# Patient Record
Sex: FEMALE | Race: WHITE | Employment: FULL TIME | URBAN - METROPOLITAN AREA
[De-identification: names, ages, dates, MRNs, and addresses within clinical notes are randomized per-mention and may not be internally consistent; named-entity substitution may affect disease eponyms.]

---

## 2021-06-16 PROBLEM — J02.0 STREP PHARYNGITIS: Status: ACTIVE | Noted: 2021-06-06

## 2021-09-13 ENCOUNTER — HOSPITAL ENCOUNTER (OUTPATIENT)
Facility: CLINIC | Age: 26
Discharge: HOME | End: 2021-09-13
Attending: STUDENT IN AN ORGANIZED HEALTH CARE EDUCATION/TRAINING PROGRAM | Admitting: STUDENT IN AN ORGANIZED HEALTH CARE EDUCATION/TRAINING PROGRAM
Payer: COMMERCIAL

## 2021-09-13 ENCOUNTER — HOSPITAL ENCOUNTER (EMERGENCY)
Facility: CLINIC | Age: 26
Discharge: LEFT BEFORE DISCHARGE | End: 2021-09-13

## 2021-09-13 ENCOUNTER — HOSPITAL ENCOUNTER (OUTPATIENT)
Facility: CLINIC | Age: 26
End: 2021-09-13
Admitting: STUDENT IN AN ORGANIZED HEALTH CARE EDUCATION/TRAINING PROGRAM
Payer: COMMERCIAL

## 2021-09-13 VITALS
HEART RATE: 75 BPM | HEIGHT: 63 IN | BODY MASS INDEX: 22.32 KG/M2 | TEMPERATURE: 98.2 F | RESPIRATION RATE: 16 BRPM | WEIGHT: 126 LBS | SYSTOLIC BLOOD PRESSURE: 116 MMHG | DIASTOLIC BLOOD PRESSURE: 60 MMHG

## 2021-09-13 VITALS
RESPIRATION RATE: 16 BRPM | WEIGHT: 126 LBS | TEMPERATURE: 97.6 F | OXYGEN SATURATION: 100 % | SYSTOLIC BLOOD PRESSURE: 103 MMHG | DIASTOLIC BLOOD PRESSURE: 54 MMHG | HEART RATE: 80 BPM

## 2021-09-13 DIAGNOSIS — Z36.89 ENCOUNTER FOR TRIAGE IN PREGNANT PATIENT: Primary | ICD-10-CM

## 2021-09-13 DIAGNOSIS — O23.599 BACTERIAL VAGINOSIS IN PREGNANCY: ICD-10-CM

## 2021-09-13 DIAGNOSIS — B96.89 BACTERIAL VAGINOSIS IN PREGNANCY: ICD-10-CM

## 2021-09-13 LAB
ALBUMIN UR-MCNC: NEGATIVE MG/DL
AMORPH CRY #/AREA URNS HPF: ABNORMAL /HPF
APPEARANCE UR: ABNORMAL
BACTERIA #/AREA URNS HPF: ABNORMAL /HPF
BILIRUB UR QL STRIP: NEGATIVE
CLUE CELLS: PRESENT
COLOR UR AUTO: ABNORMAL
GLUCOSE UR STRIP-MCNC: NEGATIVE MG/DL
HGB UR QL STRIP: NEGATIVE
KETONES UR STRIP-MCNC: NEGATIVE MG/DL
LEUKOCYTE ESTERASE UR QL STRIP: NEGATIVE
NITRATE UR QL: NEGATIVE
PH UR STRIP: 8 [PH] (ref 5–7)
RBC URINE: 2 /HPF
SP GR UR STRIP: 1.02 (ref 1–1.03)
SQUAMOUS EPITHELIAL: <1 /HPF
TRICHOMONAS, WET PREP: ABNORMAL
UROBILINOGEN UR STRIP-MCNC: <2 MG/DL
WBC URINE: 1 /HPF
WBC'S/HIGH POWER FIELD, WET PREP: ABNORMAL
YEAST, WET PREP: ABNORMAL

## 2021-09-13 PROCEDURE — 87210 SMEAR WET MOUNT SALINE/INK: CPT | Performed by: STUDENT IN AN ORGANIZED HEALTH CARE EDUCATION/TRAINING PROGRAM

## 2021-09-13 PROCEDURE — G0463 HOSPITAL OUTPT CLINIC VISIT: HCPCS

## 2021-09-13 PROCEDURE — 81001 URINALYSIS AUTO W/SCOPE: CPT | Performed by: STUDENT IN AN ORGANIZED HEALTH CARE EDUCATION/TRAINING PROGRAM

## 2021-09-13 RX ORDER — LIDOCAINE 40 MG/G
CREAM TOPICAL
Status: DISCONTINUED | OUTPATIENT
Start: 2021-09-13 | End: 2021-09-13 | Stop reason: HOSPADM

## 2021-09-13 RX ORDER — PRENATAL VIT/IRON FUM/FOLIC AC 27MG-0.8MG
1 TABLET ORAL DAILY
COMMUNITY

## 2021-09-13 RX ORDER — METRONIDAZOLE 500 MG/1
500 TABLET ORAL 2 TIMES DAILY
Qty: 14 TABLET | Refills: 0 | Status: SHIPPED | OUTPATIENT
Start: 2021-09-13 | End: 2021-09-20

## 2021-09-13 ASSESSMENT — MIFFLIN-ST. JEOR: SCORE: 1290.66

## 2021-09-13 NOTE — PROGRESS NOTES
[place] [facility]/o abdominal cramping. . 18 wks. Pt brought to room 2120. VSS. FHT's via doppler 155, movement noted. Pt states she has an anterior placenta and does not feel regular fetal movement. VSS. Uterus soft, non tender. Pt states she has vaginal discharge and hx of UTI's. Provider notified. UA and wet prep collected.

## 2021-09-13 NOTE — PROGRESS NOTES
"OB Triage Note        Assessment and Plan:     Jaz Richey is a 26 year old  at 18w0d coming in for crampy abdominal pain with a UA and wet prep supportive of BV. Considered spontaneous  given history of  vaginal births though not likely given normal FHR on doppler with normal movement, no bleeding or fluid. Cervical change unknown given posterior cervix unable to assess. Discussed with patient, agreed to antibiotic treatment, encouraged PO fluids, and will follow-up with outpatient OB.     Patient Active Problem List   Diagnosis     Encounter for triage in pregnant patient     -Start Metronidazole 500mg BID for 7 day course  -Encourage PO fluid  -Follow-up outpatient OB in 7 days or sooner if symptoms persist or worsen  -Discharge to home    Patient discussed with attending physician, Dr. Nona Hart , who agrees with the plan.      Cristina Cortez MD PGY1 2021  [facility] Medicine Residency Program       Subjective:     Jaz Richey is a  26 year old female at 18w0d with a current prenatal history significant for prior vaginal births at 36 weeks gestation who presents to OB triage with \"crampy\" abdominal pain. Had intercourse 3 days ago with some uterine irritation, became constant \"crampy\" lower abdominal and low back pain morning of 21. Cramping is constant, does not come and go.  No fever, n/v, coughing, SOB, chest pain, urinary frequency, or dysuria. No change in discharge, no fluid, spotting, or change in odor.     Estimated Date of Delivery: 2022 No LMP recorded. Patient is pregnant.       Her prenatal course has been uncomplicated.    Prenatal labs:   No results found for: ABO, RH, AS, HEPBANG, CHPCRT, GCPCRT, TREPAB, RUBELLAABIGG, HGB, HIV, GBS       Review of Systems:   EYES: no acute vision problems or changes  ENT: no ear problems, no mouth problems, no throat problems  RESP: no significant cough, no shortness of " "breath  CV: no chest pain, no palpitations, no new or worsening peripheral edema  GI: no nausea, no vomiting, no constipation, no diarrhea  : no frequency, no dysuria, no hematuria  NEURO: no weakness, no dizziness, no headaches         Physical Exam:   Vitals:   /60   Pulse 75   Temp 98.2  F (36.8  C) (Oral)   Resp 16   Ht 1.6 m (5' 3\")   Wt 57.2 kg (126 lb)   BMI 22.32 kg/m    126 lbs 0 oz  Estimated body mass index is 22.32 kg/m  as calculated from the following:    Height as of this encounter: 1.6 m (5' 3\").    Weight as of this encounter: 57.2 kg (126 lb).    GEN: Awake, alert in no apparent distress, sitting comfortably in bed   HEENT: grossly normal  RESPIRATORY: clear to auscultation bilaterally, no increased work of breathing  BACK:  no costovertebral angle tenderness   CARDIOVASCULAR: RRR, no murmur  ABDOMEN: gravid, soft, NTTP  PELVIC:  no fluid noted, no blood noted.   Cervix: Posterior, unable to assess  EXT:  no edema or calf tenderness    Doppler  Fetal baseline rate 155 normal      Labs today:  Results for orders placed or performed during the hospital encounter of 09/13/21   UA with Microscopic     Status: Abnormal   Result Value Ref Range    Color Urine Light Yellow Colorless, Straw, Light Yellow, Yellow    Appearance Urine Turbid (A) Clear    Glucose Urine Negative Negative mg/dL    Bilirubin Urine Negative Negative    Ketones Urine Negative Negative mg/dL    Specific Gravity Urine 1.017 1.001 - 1.030    Blood Urine Negative Negative    pH Urine 8.0 (H) 5.0 - 7.0    Protein Albumin Urine Negative Negative mg/dL    Urobilinogen Urine <2.0 <2.0 mg/dL    Nitrite Urine Negative Negative    Leukocyte Esterase Urine Negative Negative    Bacteria Urine Few (A) None Seen /HPF    Amorphous Crystals Urine Moderate (A) None Seen /HPF    RBC Urine 2 <=2 /HPF    WBC Urine 1 <=5 /HPF    Squamous Epithelials Urine <1 <=1 /HPF   Wet preparation     Status: Abnormal    Specimen: Vagina; Swab   Result " Value Ref Range    Trichomonas Absent Absent    Yeast Absent Absent    Clue Cells Present (A) Absent    WBCs/high power field 2+ (A) None

## 2021-09-13 NOTE — DISCHARGE INSTRUCTIONS
Please excuse patient from work today, 9/13/2021, to allow for therapeutic rest. Patient may return to work after 9/13/2021 without restrictions.

## 2021-10-21 ENCOUNTER — LAB REQUISITION (OUTPATIENT)
Dept: LAB | Facility: CLINIC | Age: 26
End: 2021-10-21
Payer: MEDICAID

## 2021-10-21 DIAGNOSIS — Z03.818 ENCOUNTER FOR OBSERVATION FOR SUSPECTED EXPOSURE TO OTHER BIOLOGICAL AGENTS RULED OUT: ICD-10-CM

## 2021-10-21 PROCEDURE — U0003 INFECTIOUS AGENT DETECTION BY NUCLEIC ACID (DNA OR RNA); SEVERE ACUTE RESPIRATORY SYNDROME CORONAVIRUS 2 (SARS-COV-2) (CORONAVIRUS DISEASE [COVID-19]), AMPLIFIED PROBE TECHNIQUE, MAKING USE OF HIGH THROUGHPUT TECHNOLOGIES AS DESCRIBED BY CMS-2020-01-R: HCPCS | Performed by: FAMILY MEDICINE

## 2021-10-23 LAB — SARS-COV-2 RNA RESP QL NAA+PROBE: NOT DETECTED

## 2021-11-01 PROCEDURE — U0003 INFECTIOUS AGENT DETECTION BY NUCLEIC ACID (DNA OR RNA); SEVERE ACUTE RESPIRATORY SYNDROME CORONAVIRUS 2 (SARS-COV-2) (CORONAVIRUS DISEASE [COVID-19]), AMPLIFIED PROBE TECHNIQUE, MAKING USE OF HIGH THROUGHPUT TECHNOLOGIES AS DESCRIBED BY CMS-2020-01-R: HCPCS | Mod: ORL | Performed by: FAMILY MEDICINE

## 2021-11-03 ENCOUNTER — LAB REQUISITION (OUTPATIENT)
Dept: LAB | Facility: CLINIC | Age: 26
End: 2021-11-03
Payer: COMMERCIAL

## 2021-11-03 DIAGNOSIS — Z03.818 ENCOUNTER FOR OBSERVATION FOR SUSPECTED EXPOSURE TO OTHER BIOLOGICAL AGENTS RULED OUT: ICD-10-CM

## 2021-11-04 LAB — SARS-COV-2 RNA RESP QL NAA+PROBE: NEGATIVE

## 2021-12-29 PROCEDURE — U0005 INFEC AGEN DETEC AMPLI PROBE: HCPCS | Mod: ORL | Performed by: FAMILY MEDICINE

## 2021-12-30 ENCOUNTER — LAB REQUISITION (OUTPATIENT)
Dept: LAB | Facility: CLINIC | Age: 26
End: 2021-12-30
Payer: COMMERCIAL

## 2021-12-30 DIAGNOSIS — Z20.822 CONTACT WITH AND (SUSPECTED) EXPOSURE TO COVID-19: ICD-10-CM

## 2021-12-31 LAB — SARS-COV-2 RNA RESP QL NAA+PROBE: NEGATIVE

## 2022-03-27 ENCOUNTER — HOSPITAL ENCOUNTER (EMERGENCY)
Facility: CLINIC | Age: 27
Discharge: HOME | End: 2022-03-27
Attending: EMERGENCY MEDICINE | Admitting: EMERGENCY MEDICINE
Payer: COMMERCIAL

## 2022-03-27 VITALS
RESPIRATION RATE: 18 BRPM | TEMPERATURE: 97.9 F | SYSTOLIC BLOOD PRESSURE: 135 MMHG | HEART RATE: 100 BPM | OXYGEN SATURATION: 100 % | DIASTOLIC BLOOD PRESSURE: 78 MMHG | WEIGHT: 132 LBS

## 2022-03-27 DIAGNOSIS — J02.0 STREPTOCOCCAL PHARYNGITIS: ICD-10-CM

## 2022-03-27 PROCEDURE — 99283 EMERGENCY DEPT VISIT LOW MDM: CPT

## 2022-03-27 RX ORDER — AMOXICILLIN 500 MG/1
1000 CAPSULE ORAL 2 TIMES DAILY
Qty: 28 CAPSULE | Refills: 0 | Status: SHIPPED | OUTPATIENT
Start: 2022-03-27 | End: 2022-04-03

## 2022-03-27 ASSESSMENT — ENCOUNTER SYMPTOMS
FEVER: 0
CHILLS: 1
SORE THROAT: 1
ABDOMINAL PAIN: 0
SHORTNESS OF BREATH: 0

## 2022-03-28 NOTE — ED PROVIDER NOTES
EMERGENCY DEPARTMENT ENCOUNTER      NAME: Mery Edmondson  AGE: 26 year old female  YOB: 1995  MRN: [id number]  EVALUATION DATE & TIME: 3/27/2022 11:40 PM    PCP: No Ref-Primary, Physician    ED PROVIDER: Michelet Conley M.D.      Chief Complaint   Patient presents with     Pharyngitis         FINAL IMPRESSION:  1. Streptococcal pharyngitis          ED COURSE & MEDICAL DECISION MAKING:    Pertinent Labs & Imaging studies reviewed. (See chart for details)  26 year old female presents to the Emergency Department for evaluation of sore throat.  Patient has a history of strep.  Does appear to be strep here.  Patient was referred to be treated versus being tested.  I think that is reasonable as she is positive by [facility] criteria.  Will discharge her home.  Given amoxicillin.  Continue Tylenol ibuprofen.  No signs of airway compromise.  No signs of abscess.    11:43 PM I met with the patient to gather history and to perform my initial exam. I discussed the plan for care while in the Emergency Department. PPE: gloves, N95, and eye protection. We discussed the plan for discharge and the patient is agreeable. Reviewed supportive cares, symptomatic treatment, outpatient follow up, and reasons to return to the Emergency Department. Patient to be discharged by ED RN.     At the conclusion of the encounter I discussed the results of all of the tests and the disposition. The questions were answered. The patient or family acknowledged understanding and was agreeable with the care plan.            MEDICATIONS GIVEN IN THE EMERGENCY:  Medications - No data to display    NEW PRESCRIPTIONS STARTED AT TODAY'S ER VISIT  New Prescriptions    AMOXICILLIN (AMOXIL) 500 MG CAPSULE    Take 2 capsules (1,000 mg) by mouth 2 times daily for 7 days          =================================================================    HPI    Patient information was obtained from: Patient    Use of : N/A         Mery Edmondson is a 26  year old female with a pertinent history of strep pharyngitis who presents to this ED via walk-in for evaluation of pharyngitis.  She has had a sore throat starting this morning.  Has noticed worsening lymphadenopathy.  No cough.  Has had low-grade temp but no true fever.  Has a history of frequent strep infections.  No other sick contacts.  No abdominal pain.  No shortness of breath.  Does not take any medications.  Does smoke cigarettes.        REVIEW OF SYSTEMS   Review of Systems   Constitutional: Positive for chills. Negative for fever.   HENT: Positive for sore throat.    Respiratory: Negative for shortness of breath.    Cardiovascular: Negative for chest pain.   Gastrointestinal: Negative for abdominal pain.   All other systems reviewed and are negative.       PAST MEDICAL HISTORY:  History reviewed. No pertinent past medical history.    PAST SURGICAL HISTORY:  History reviewed. No pertinent surgical history.        CURRENT MEDICATIONS:    No current facility-administered medications for this encounter.     Current Outpatient Medications   Medication     amoxicillin (AMOXIL) 500 MG capsule     ibuprofen (ADVIL,MOTRIN) 600 MG tablet         ALLERGIES:  Allergies   Allergen Reactions     Clindamycin Hives and Rash       FAMILY HISTORY:  History reviewed. No pertinent family history.    SOCIAL HISTORY:   Social History     Socioeconomic History     Marital status: Single     Spouse name: Not on file     Number of children: Not on file     Years of education: Not on file     Highest education level: Not on file   Occupational History     Not on file   Tobacco Use     Smoking status: Not on file     Smokeless tobacco: Not on file   Substance and Sexual Activity     Alcohol use: Not on file     Drug use: Not on file     Sexual activity: Not on file   Other Topics Concern     Not on file   Social History Narrative     Not on file     Social Determinants of Health     Financial Resource Strain: Not on file   Food  Insecurity: Not on file   Transportation Needs: Not on file   Physical Activity: Not on file   Stress: Not on file   Social Connections: Not on file   Intimate Partner Violence: Not on file   Housing Stability: Not on file       VITALS:  /78   Pulse 100   Temp 97.9  F (36.6  C) (Oral)   Resp 18   Wt 59.9 kg (132 lb)   LMP 03/22/2022   SpO2 100%     PHYSICAL EXAM    Physical Exam  Constitutional:       General: She is not in acute distress.     Appearance: She is not diaphoretic.   HENT:      Head: Atraumatic.      Mouth/Throat:      Mouth: Mucous membranes are moist.      Pharynx: Oropharyngeal exudate and posterior oropharyngeal erythema present.      Tonsils: Tonsillar exudate present. No tonsillar abscesses.   Eyes:      General: No scleral icterus.     Pupils: Pupils are equal, round, and reactive to light.   Cardiovascular:      Rate and Rhythm: Normal rate and regular rhythm.      Heart sounds: Normal heart sounds.   Pulmonary:      Effort: No respiratory distress.      Breath sounds: Normal breath sounds.   Abdominal:      General: Bowel sounds are normal.      Palpations: Abdomen is soft.      Tenderness: There is no abdominal tenderness.   Musculoskeletal:         General: No tenderness.   Lymphadenopathy:      Cervical: Cervical adenopathy present.   Skin:     General: Skin is warm.      Findings: No rash.           LAB:  All pertinent labs reviewed and interpreted.  Labs Ordered and Resulted from Time of ED Arrival to Time of ED Departure - No data to display    RADIOLOGY:  Reviewed all pertinent imaging. Please see official radiology report.  No orders to display           IDania, am serving as a scribe to document services personally performed by Dr. Michelet Conley, based on my observation and the provider's statements to me. IMichelet MD attest that Dania Sarabia is acting in a scribe capacity, has observed my performance of the services and has documented them in accordance  with my direction.    Michelet Conley M.D.  Emergency Medicine  [facility] EMERGENCY ROOM  [address] [id number] [phone]  Dept: [phone]     Michelet Conley MD  03/27/22 6907

## 2022-09-21 PROCEDURE — U0003 INFECTIOUS AGENT DETECTION BY NUCLEIC ACID (DNA OR RNA); SEVERE ACUTE RESPIRATORY SYNDROME CORONAVIRUS 2 (SARS-COV-2) (CORONAVIRUS DISEASE [COVID-19]), AMPLIFIED PROBE TECHNIQUE, MAKING USE OF HIGH THROUGHPUT TECHNOLOGIES AS DESCRIBED BY CMS-2020-01-R: HCPCS | Mod: ORL | Performed by: FAMILY MEDICINE

## 2022-09-29 ENCOUNTER — LAB REQUISITION (OUTPATIENT)
Dept: LAB | Facility: CLINIC | Age: 27
End: 2022-09-29
Payer: COMMERCIAL

## 2022-09-29 DIAGNOSIS — U07.1 COVID-19: ICD-10-CM

## 2022-09-29 PROCEDURE — U0003 INFECTIOUS AGENT DETECTION BY NUCLEIC ACID (DNA OR RNA); SEVERE ACUTE RESPIRATORY SYNDROME CORONAVIRUS 2 (SARS-COV-2) (CORONAVIRUS DISEASE [COVID-19]), AMPLIFIED PROBE TECHNIQUE, MAKING USE OF HIGH THROUGHPUT TECHNOLOGIES AS DESCRIBED BY CMS-2020-01-R: HCPCS | Mod: ORL | Performed by: FAMILY MEDICINE

## 2022-09-30 LAB — SARS-COV-2 RNA RESP QL NAA+PROBE: NEGATIVE

## 2022-12-18 ENCOUNTER — HOSPITAL ENCOUNTER (EMERGENCY)
Facility: CLINIC | Age: 27
Discharge: HOME | End: 2022-12-18
Attending: STUDENT IN AN ORGANIZED HEALTH CARE EDUCATION/TRAINING PROGRAM | Admitting: STUDENT IN AN ORGANIZED HEALTH CARE EDUCATION/TRAINING PROGRAM
Payer: COMMERCIAL

## 2022-12-18 VITALS
WEIGHT: 148 LBS | OXYGEN SATURATION: 97 % | BODY MASS INDEX: 26.22 KG/M2 | SYSTOLIC BLOOD PRESSURE: 139 MMHG | RESPIRATION RATE: 18 BRPM | DIASTOLIC BLOOD PRESSURE: 69 MMHG | HEIGHT: 63 IN | TEMPERATURE: 98.2 F | HEART RATE: 74 BPM

## 2022-12-18 DIAGNOSIS — J02.0 STREP THROAT: ICD-10-CM

## 2022-12-18 LAB
DEPRECATED S PYO AG THROAT QL EIA: POSITIVE
FLUAV RNA SPEC QL NAA+PROBE: NEGATIVE
FLUBV RNA RESP QL NAA+PROBE: NEGATIVE
HCG UR QL: NEGATIVE
RSV RNA SPEC NAA+PROBE: NEGATIVE
SARS-COV-2 RNA RESP QL NAA+PROBE: NEGATIVE

## 2022-12-18 PROCEDURE — 87880 STREP A ASSAY W/OPTIC: CPT | Performed by: EMERGENCY MEDICINE

## 2022-12-18 PROCEDURE — 81025 URINE PREGNANCY TEST: CPT | Performed by: STUDENT IN AN ORGANIZED HEALTH CARE EDUCATION/TRAINING PROGRAM

## 2022-12-18 PROCEDURE — 99284 EMERGENCY DEPT VISIT MOD MDM: CPT

## 2022-12-18 PROCEDURE — 250N000011 HC RX IP 250 OP 636: Performed by: STUDENT IN AN ORGANIZED HEALTH CARE EDUCATION/TRAINING PROGRAM

## 2022-12-18 PROCEDURE — 87637 SARSCOV2&INF A&B&RSV AMP PRB: CPT | Performed by: EMERGENCY MEDICINE

## 2022-12-18 PROCEDURE — 96372 THER/PROPH/DIAG INJ SC/IM: CPT | Performed by: STUDENT IN AN ORGANIZED HEALTH CARE EDUCATION/TRAINING PROGRAM

## 2022-12-18 PROCEDURE — C9803 HOPD COVID-19 SPEC COLLECT: HCPCS

## 2022-12-18 PROCEDURE — 250N000013 HC RX MED GY IP 250 OP 250 PS 637: Performed by: STUDENT IN AN ORGANIZED HEALTH CARE EDUCATION/TRAINING PROGRAM

## 2022-12-18 RX ORDER — ONDANSETRON 4 MG/1
4 TABLET, ORALLY DISINTEGRATING ORAL ONCE
Status: COMPLETED | OUTPATIENT
Start: 2022-12-18 | End: 2022-12-18

## 2022-12-18 RX ORDER — ACETAMINOPHEN 325 MG/1
650 TABLET ORAL ONCE
Status: COMPLETED | OUTPATIENT
Start: 2022-12-18 | End: 2022-12-18

## 2022-12-18 RX ADMIN — PENICILLIN G BENZATHINE 1.2 MILLION UNITS: 1200000 INJECTION, SUSPENSION INTRAMUSCULAR at 21:55

## 2022-12-18 RX ADMIN — ONDANSETRON 4 MG: 4 TABLET, ORALLY DISINTEGRATING ORAL at 21:15

## 2022-12-18 RX ADMIN — ACETAMINOPHEN 650 MG: 325 TABLET, FILM COATED ORAL at 21:15

## 2022-12-18 ASSESSMENT — ACTIVITIES OF DAILY LIVING (ADL): ADLS_ACUITY_SCORE: 33

## 2022-12-18 ASSESSMENT — ENCOUNTER SYMPTOMS
VOMITING: 1
ABDOMINAL PAIN: 0
DYSURIA: 0
CHILLS: 1
COUGH: 0
APPETITE CHANGE: 1
SORE THROAT: 1
FEVER: 1
NAUSEA: 1

## 2022-12-18 NOTE — Clinical Note
Meyr Edmondson was seen and treated in our emergency department on 12/18/2022.  She may return to work on 12/20/2022.       If you have any questions or concerns, please don't hesitate to call.      Miesha Dick MD

## 2022-12-19 NOTE — ED TRIAGE NOTES
Pt presents to the ED with c/o of sore throat, fevers, body aches, chills that started yesterday. Pt took OTC medications for sx. Pt reports vomiting x 2.      Triage Assessment     Row Name 12/18/22 2016       Triage Assessment (Adult)    Airway WDL WDL       Respiratory WDL    Respiratory WDL WDL       Skin Circulation/Temperature WDL    Skin Circulation/Temperature WDL WDL       Cardiac WDL    Cardiac WDL WDL       Peripheral/Neurovascular WDL    Peripheral Neurovascular WDL WDL       Cognitive/Neuro/Behavioral WDL    Cognitive/Neuro/Behavioral WDL WDL       River Coma Scale    Best Eye Response 4-->(E4) spontaneous    Best Motor Response 6-->(M6) obeys commands    Best Verbal Response 5-->(V5) oriented    [place] Coma Scale Score 15

## 2022-12-19 NOTE — ED PROVIDER NOTES
NAME: Mery Edmondson  AGE: 27 year old female  YOB: 1995  MRN: [id number]  EVALUATION DATE & TIME: 2022  8:19 PM    PCP: No Ref-Primary, Physician  ED PROVIDER: Miesha Dick MD.    No chief complaint on file.      FINAL IMPRESSION:  1. Strep throat        MEDICAL DECISION MAKIN:44 PM I met with the patient, obtained history, performed an initial exam, and discussed options and plan for diagnostics and treatment here in the ED.   10:40 PM I rechecked and updated the patient. Discussed discharge. Patient was agreeable.    27 year old female presents to the Emergency Department for evaluation of sore throat. Throat with erythema otherwise she is well appearing and hydrated. Based on exam unlikely to be peritonsillar abscess, retropharyngeal abscess, epiglottis, sara's. Had some nausea/vomiting, abdominal exam is reassuring, do not suspect intraabdominal pathology warranting CT/other imaging. Strep returned positive. She would like to do the IM penicillin which was given. Recommended primary care follow up as needed. Strict return precautions discussed and patient is in agreement with plan, endorses understanding and their questions were answered.    Medical Decision Making    History:    Supplemental history from: Documented in HPI, if applicable    External Record(s) reviewed: Documented in HPI, if applicable.    Work Up:    Chart documentation includes differential considered and any EKGs or imaging interpreted by provider.    In additional to work up documented, I considered the following work up: See chart documentation, if applicable.    External consultation:    Discussion of management with another provider: See chart documentation, if applicable    Complicating factors:    Care impacted by chronic illness: None    Care affected by social determinants of health: N/A     MEDICATIONS GIVEN IN THE EMERGENCY:  Medications   ondansetron (ZOFRAN ODT) ODT tab 4 mg (4 mg Oral Given 22  2115)   acetaminophen (TYLENOL) tablet 650 mg (650 mg Oral Given 12/18/22 2115)   penicillin G benzathine (BICILLIN L-A) injection 1.2 Million Units (1.2 Million Units Intramuscular Given 12/18/22 2155)       NEW PRESCRIPTIONS STARTED AT TODAY'S ER VISIT:  Discharge Medication List as of 12/18/2022 10:43 PM           =================================================================  HPI    Patient information was obtained from: Patient  Use of : N/A       Mery Edmondson is a 27 year old female with a past medical history of COVID-19 (1/10/2022) and strep pharyngitis (6/6/2021), who presents with a sore throat.    Patient reports she developed a mild sore throat last night and she is concerned for strep. She endorses a fever of 102 F. She took Excedrin prior to arrival. She endorses vomiting x2 and decreased appetite. Patient reports body aches and chills starting last night, as well. No cough, chest pain, abdominal pain, or dysuria. No concerns of pregnancy. Patient denies any other current complaints.    REVIEW OF SYSTEMS   Review of Systems   Constitutional: Positive for appetite change, chills and fever.        Positive for body aches.   HENT: Positive for sore throat.    Respiratory: Negative for cough.    Cardiovascular: Negative for chest pain.   Gastrointestinal: Positive for nausea and vomiting. Negative for abdominal pain.   Genitourinary: Negative for dysuria.   All other systems reviewed and are negative.       PAST MEDICAL HISTORY:  No past medical history on file.    PAST SURGICAL HISTORY:  No past surgical history on file.    CURRENT MEDICATIONS:    No current facility-administered medications for this encounter.    Current Outpatient Medications:      ibuprofen (ADVIL,MOTRIN) 600 MG tablet, [IBUPROFEN (ADVIL,MOTRIN) 600 MG TABLET] Take 1 tablet (600 mg total) by mouth every 8 (eight) hours as needed for pain., Disp: 30 tablet, Rfl: 0    ALLERGIES:  Allergies   Allergen Reactions      "Clindamycin Hives and Rash       FAMILY HISTORY:  No family history on file.    SOCIAL HISTORY:   Social History     Socioeconomic History     Marital status: Single       PHYSICAL EXAM:    Vitals: /69   Pulse 74   Temp 98.2  F (36.8  C) (Oral)   Resp 18   Ht 1.6 m (5' 3\")   Wt 67.1 kg (148 lb)   SpO2 97%   BMI 26.22 kg/m     Constitutional: Well developed, well nourished. No acute distress.  HENT: Normocephalic, atraumatic, mucous membranes moist.  Mouth: neck supple without tenderness, no tenderness with manipulation of cricoid or thyroid cartilages, posteriororopharynx with erythema, no exudates, uvula is midline. No muffled voice or trismus. Floor of mouth is soft.  Eyes: Pupils mid-range, sclera white, no discharge  Respiratory: CTAB, no respiratory distress, no wheezing, speaks full sentences easily.  Cardiovascular: Normal heart rate, regular rhythm  GI: Soft, not distended, not tender to palpation, no palpable masses  Musculoskeletal: Moving all 4 extremities intentionally and without pain. No obvious deformity.  Skin: Warm, dry, no rash.  Neurologic: Alert & oriented x 3, speech clear, moving all extremities spontaneously   Psychiatric: Affect normal, cooperative.     LAB:  All pertinent labs reviewed and interpreted.  Labs Ordered and Resulted from Time of ED Arrival to Time of ED Departure   STREPTOCOCCUS A RAPID SCREEN W REFELX TO PCR - Abnormal       Result Value    Group A Strep antigen Positive (*)    INFLUENZA A/B & SARS-COV2 PCR MULTIPLEX - Normal    Influenza A PCR Negative      Influenza B PCR Negative      RSV PCR Negative      SARS CoV2 PCR Negative     HCG QUALITATIVE URINE - Normal    hCG Urine Qualitative Negative         RADIOLOGY:  No orders to display       EKG:   N/A    PROCEDURES:   Procedures     Maddie JONES, am serving as a scribe to document services personally performed by Dr. Miesha Dick based on my observation and the provider's statements to me. I, Miesha Dick, " MD attest that Maddie Martínez is acting in a scribe capacity, has observed my performance of the services and has documented them in accordance with my direction.    Miesha Dick M.D.  Emergency Medicine  [facility] EMERGENCY ROOM  [address] [id number]  [phone]  Dept: [phone]     Miesha Dick MD  12/18/22 9734

## 2022-12-19 NOTE — DISCHARGE INSTRUCTIONS
Return with difficulty breathing, unable to keep down foods/fluids, neck swelling or other worsening symptoms or concerns

## 2023-01-13 ENCOUNTER — LAB REQUISITION (OUTPATIENT)
Dept: LAB | Facility: CLINIC | Age: 28
End: 2023-01-13
Payer: COMMERCIAL

## 2023-01-13 DIAGNOSIS — Z03.818 ENCOUNTER FOR OBSERVATION FOR SUSPECTED EXPOSURE TO OTHER BIOLOGICAL AGENTS RULED OUT: ICD-10-CM

## 2023-01-13 PROCEDURE — U0003 INFECTIOUS AGENT DETECTION BY NUCLEIC ACID (DNA OR RNA); SEVERE ACUTE RESPIRATORY SYNDROME CORONAVIRUS 2 (SARS-COV-2) (CORONAVIRUS DISEASE [COVID-19]), AMPLIFIED PROBE TECHNIQUE, MAKING USE OF HIGH THROUGHPUT TECHNOLOGIES AS DESCRIBED BY CMS-2020-01-R: HCPCS | Mod: ORL | Performed by: FAMILY MEDICINE

## 2023-01-14 LAB — SARS-COV-2 RNA RESP QL NAA+PROBE: POSITIVE

## 2023-07-16 ENCOUNTER — HOSPITAL ENCOUNTER (EMERGENCY)
Facility: CLINIC | Age: 28
Discharge: LEFT BEFORE DISCHARGE | End: 2023-07-16
Payer: COMMERCIAL

## 2024-02-07 ENCOUNTER — HOSPITAL ENCOUNTER (EMERGENCY)
Facility: CLINIC | Age: 29
Discharge: HOME | End: 2024-02-07
Admitting: STUDENT IN AN ORGANIZED HEALTH CARE EDUCATION/TRAINING PROGRAM
Payer: MEDICAID

## 2024-02-07 ENCOUNTER — APPOINTMENT (OUTPATIENT)
Dept: RADIOLOGY | Facility: CLINIC | Age: 29
End: 2024-02-07
Attending: STUDENT IN AN ORGANIZED HEALTH CARE EDUCATION/TRAINING PROGRAM
Payer: MEDICAID

## 2024-02-07 VITALS
RESPIRATION RATE: 14 BRPM | BODY MASS INDEX: 26.57 KG/M2 | HEART RATE: 120 BPM | TEMPERATURE: 98.9 F | DIASTOLIC BLOOD PRESSURE: 71 MMHG | OXYGEN SATURATION: 99 % | WEIGHT: 150 LBS | SYSTOLIC BLOOD PRESSURE: 132 MMHG

## 2024-02-07 DIAGNOSIS — S92.302A CLOSED FRACTURE OF NECK OF METATARSAL BONE OF LEFT FOOT, INITIAL ENCOUNTER: ICD-10-CM

## 2024-02-07 PROCEDURE — 73660 X-RAY EXAM OF TOE(S): CPT | Mod: LT

## 2024-02-07 PROCEDURE — 28470 CLTX METATARSAL FX WO MNP EA: CPT | Mod: LT

## 2024-02-07 PROCEDURE — 250N000013 HC RX MED GY IP 250 OP 250 PS 637: Performed by: STUDENT IN AN ORGANIZED HEALTH CARE EDUCATION/TRAINING PROGRAM

## 2024-02-07 PROCEDURE — 99284 EMERGENCY DEPT VISIT MOD MDM: CPT | Mod: 25

## 2024-02-07 RX ORDER — IBUPROFEN 600 MG/1
600 TABLET, FILM COATED ORAL ONCE
Status: COMPLETED | OUTPATIENT
Start: 2024-02-07 | End: 2024-02-07

## 2024-02-07 RX ORDER — ACETAMINOPHEN 325 MG/1
650 TABLET ORAL ONCE
Status: COMPLETED | OUTPATIENT
Start: 2024-02-07 | End: 2024-02-07

## 2024-02-07 RX ADMIN — IBUPROFEN 600 MG: 600 TABLET ORAL at 15:18

## 2024-02-07 RX ADMIN — ACETAMINOPHEN 650 MG: 325 TABLET ORAL at 15:18

## 2024-02-07 NOTE — Clinical Note
Mery Edmondson was seen and treated in our emergency department on 2/7/2024.  She may return to work on 02/12/2024.       If you have any questions or concerns, please don't hesitate to call.      Sunni Chambers, RN

## 2024-02-07 NOTE — DISCHARGE INSTRUCTIONS
You were seen in the emergency department today for toe pain. Your xray found a possible fracture of your 3rd and 4th metatarsal necks. You were placed in a walking boot.  Please wear this at all times while up walking around.  When at rest I recommend elevating your leg and icing the foot to help with pain and swelling. Please use your crutches at home as needed for pain.  I have given you the number for [facility], please call them to schedule follow-up    You may take Ibuprofen up to 400 mg by mouth every 4-6 hours as needed for pain. Do not exceed 2400 mg/day.  You may take Tylenol 325-1000 mg by mouth every 4-6 hours as needed for pain. Do not exceed 1000mg (1g) in 4 hours or 4 g/day from all sources.  You may combine Tylenol and Ibuprofen- up to 400 mg of ibuprofen and 1000 mg of Tylenol at the same time, up to 3 times a day for the pain

## 2024-02-07 NOTE — ED PROVIDER NOTES
Emergency Department Encounter   NAME: Mery Edmondson ; AGE: 28 year old female ; YOB: 1995 ; MRN: [id number] ; PCP: No Ref-Primary, Physician   ED PROVIDER: Sade Gillespie PA-C    Evaluation Date & Time:   No admission date for patient encounter.    CHIEF COMPLAINT:  Toe Pain        Impression and Plan   FINAL IMPRESSION:    ICD-10-CM    1. Closed fracture of neck of metatarsal bone of left foot, initial encounter  S92.302A Ankle/Foot Bracing Supplies Order Walking Boot; Left; Non-pneumatic          MDM:  Mery Edmondson is a 28 year old female with no significant PMH presenting to the emergency department for evaluation of right fourth toe pain. She states stubbing her left foot into a wall this morning and has had a lot of pain in her left fourth toe since. Endorses 10/10 pain and states she is unable to ambulate due to pain. Denies any history of surgery or injury to the foot previously.     Vitals reviewed and unremarkable aside from a pulse of 120. On exam she is resting comfortably. Differential diagnosis includes but not limited to contusion, strain, fracture, lis franc injury.  The left side toes are warm to the touch with capillary refill < 2 seconds.  No diminished sensation to light touch over the left-sided toes when compared to the right to indicate nerve injury. She was given Tylenol and ibuprofen here for pain. XR left toe found a subtle cortical offset along the third and fourth metatarsal necks which can be seen with nondisplaced fractures. This correlates clinically with her pain she is very tender to palpation over the third and fourth metatarsal heads and she is telling me she is unable to ambulate due to severe pain in that area. I have placed her in a walking boot and given her the information for follow up with [facility]. She will call to schedule follow-up with them. I recommended ice and elevation to help with pain and swelling. Patient states she has crutches at home that she  will use so she was not given a pair here in the emergency department today. We discussed concerning symptoms that warrant return to the emergency department, she is understanding and agreeable to this plan.      Medical Decision Making    History:  Supplemental history from: Documented in chart  External Record(s) reviewed: Documented in chart    Work Up:  Chart documentation includes differential considered and any EKGs or imaging independently interpreted by provider, where specified.  In additional to work up documented, I considered the following work up: Documented in chart, if applicable.    External consultation:  Discussion of management with another provider: Documented in chart, if applicable    Complicating factors:  Care impacted by chronic illness: N/A  Care affected by social determinants of health: N/A    Disposition considerations: Discharge. No recommendations on prescription strength medication(s). See documentation for any additional details.      ED COURSE:  2:50 PM I met and introduced myself to the patient. I gathered initial history and performed my physical exam. We discussed discharge, follow up, and reasons to return to the ED.     At the conclusion of the encounter I discussed the results of all the tests and the disposition. The questions were answered. The patient or family acknowledged understanding and was agreeable with the care plan.        MEDICATIONS GIVEN IN THE EMERGENCY DEPARTMENT:  Medications   ibuprofen (ADVIL/MOTRIN) tablet 600 mg (has no administration in time range)   acetaminophen (TYLENOL) tablet 650 mg (has no administration in time range)         NEW PRESCRIPTIONS STARTED AT TODAY'S ED VISIT:  New Prescriptions    No medications on file         HPI   Patient information was obtained from: patient  Use of Intrepreter: N/A     Mery Edmondson is a 28 year old female with no significant PMH presenting to the emergency department for evaluation of right fourth toe pain. She  states stubbing her left foot into a wall this morning and has had a lot of pain in her left fourth toe since. Endorses 10/10 pain and states she is unable to ambulate due to pain. Denies any history of surgery or injury to the foot previously.       Medical History     No past medical history on file.    No past surgical history on file.    No family history on file.         ibuprofen (ADVIL,MOTRIN) 600 MG tablet          Physical Exam     First Vitals:  Patient Vitals for the past 24 hrs:   BP Temp Temp src Pulse Resp SpO2 Weight   02/07/24 1352 132/71 98.9  F (37.2  C) Temporal 120 14 99 % 68 kg (150 lb)         PHYSICAL EXAM    General Appearance:  Alert, cooperative, no distress, appears stated age. She is resting comfortably in a wheelchair.  Musculoskeletal: Moving all extremities. No gross deformities. She is very tender to palpation over the third and fourth metatarsal heads. She is unable to weight-bear due to pain.  Integument: Warm, dry.  No abrasions or lacerations overlying the left foot.  There is a small area of ecchymosis on the aspect of the foot between the third and fourth digits with a small amount of underlying edema. Capillary refill < 2 seconds in all digits in the lower extremities.  The left-sided digits in the lower extremity are warm to the touch and pink.  Neurologic: Alert and orientated x3. No focal deficits.  No diminished sensation to light touch in the left digits when compared to the right.  Psych: Normal mood and affect        Results     LAB:  All pertinent labs reviewed and interpreted  Labs Ordered and Resulted from Time of ED Arrival to Time of ED Departure - No data to display    RADIOLOGY:  XR Toe Left G/E 2 Views   Final Result   IMPRESSION: There is subtle cortical offset along the third and fourth metatarsal necks which can be seen with nondisplaced fractures in the appropriate clinical setting. Continued follow-up is recommended.      NOTE: ABNORMAL REPORT      THE  DICTATION ABOVE DESCRIBES AN ABNORMALITY FOR WHICH FOLLOW-UP IS NEEDED.             Sade Gillespie PA-C   Emergency Medicine   [facility] EMERGENCY ROOM       Sade Gillespie PA-C  02/07/24 1526